# Patient Record
Sex: FEMALE | Race: WHITE | NOT HISPANIC OR LATINO | URBAN - METROPOLITAN AREA
[De-identification: names, ages, dates, MRNs, and addresses within clinical notes are randomized per-mention and may not be internally consistent; named-entity substitution may affect disease eponyms.]

---

## 2021-01-29 VITALS — TEMPERATURE: 98.3 F

## 2021-01-29 DIAGNOSIS — Z23 HIGH PRIORITY FOR COVID-19 VIRUS VACCINATION: Primary | ICD-10-CM

## 2021-01-29 PROCEDURE — 91301 PR COVID VAC MODERNA 100 MCG/0.5 ML IM: CPT | Performed by: FAMILY MEDICINE

## 2021-01-29 PROCEDURE — 0011A PR COVID VAC MODERNA 100 MCG/0.5 ML IM: CPT | Performed by: FAMILY MEDICINE

## 2021-02-26 DIAGNOSIS — Z23 HIGH PRIORITY FOR COVID-19 VIRUS VACCINATION: Primary | ICD-10-CM

## 2021-02-26 PROCEDURE — 91301 PR COVID VAC MODERNA 100 MCG/0.5 ML IM: CPT | Performed by: FAMILY MEDICINE

## 2021-02-26 PROCEDURE — 0012A PR COVID VAC MODERNA 100 MCG/0.5 ML IM: CPT | Performed by: FAMILY MEDICINE

## 2025-03-24 ENCOUNTER — OFFICE VISIT (OUTPATIENT)
Dept: FAMILY MEDICINE | Facility: CLINIC | Age: 75
End: 2025-03-24

## 2025-03-24 VITALS
BODY MASS INDEX: 20.73 KG/M2 | SYSTOLIC BLOOD PRESSURE: 115 MMHG | DIASTOLIC BLOOD PRESSURE: 64 MMHG | WEIGHT: 117 LBS | OXYGEN SATURATION: 98 % | HEIGHT: 63 IN | HEART RATE: 63 BPM

## 2025-03-24 DIAGNOSIS — N18.31 STAGE 3A CHRONIC KIDNEY DISEASE (H): ICD-10-CM

## 2025-03-24 DIAGNOSIS — R53.83 OTHER FATIGUE: Primary | ICD-10-CM

## 2025-03-24 DIAGNOSIS — Z12.31 ENCOUNTER FOR SCREENING MAMMOGRAM FOR BREAST CANCER: ICD-10-CM

## 2025-03-24 DIAGNOSIS — F33.0 MAJOR DEPRESSIVE DISORDER, RECURRENT EPISODE, MILD: ICD-10-CM

## 2025-03-24 DIAGNOSIS — F10.21 CHRONIC ALCOHOLISM IN REMISSION (H): ICD-10-CM

## 2025-03-24 PROCEDURE — 3078F DIAST BP <80 MM HG: CPT | Performed by: STUDENT IN AN ORGANIZED HEALTH CARE EDUCATION/TRAINING PROGRAM

## 2025-03-24 PROCEDURE — 3074F SYST BP LT 130 MM HG: CPT | Performed by: STUDENT IN AN ORGANIZED HEALTH CARE EDUCATION/TRAINING PROGRAM

## 2025-03-24 PROCEDURE — 99203 OFFICE O/P NEW LOW 30 MIN: CPT | Performed by: STUDENT IN AN ORGANIZED HEALTH CARE EDUCATION/TRAINING PROGRAM

## 2025-03-24 RX ORDER — MULTIPLE VITAMINS W/ MINERALS TAB 9MG-400MCG
1 TAB ORAL DAILY
COMMUNITY

## 2025-03-24 RX ORDER — LISINOPRIL 10 MG/1
10 TABLET ORAL DAILY
COMMUNITY
Start: 2023-09-02

## 2025-03-24 RX ORDER — TRAZODONE HYDROCHLORIDE 50 MG/1
50 TABLET ORAL AT BEDTIME
COMMUNITY
Start: 2023-11-20

## 2025-03-24 RX ORDER — ATORVASTATIN CALCIUM 20 MG/1
20 TABLET, FILM COATED ORAL DAILY
COMMUNITY
Start: 2023-09-28

## 2025-03-24 ASSESSMENT — PATIENT HEALTH QUESTIONNAIRE - PHQ9
SUM OF ALL RESPONSES TO PHQ QUESTIONS 1-9: 8
10. IF YOU CHECKED OFF ANY PROBLEMS, HOW DIFFICULT HAVE THESE PROBLEMS MADE IT FOR YOU TO DO YOUR WORK, TAKE CARE OF THINGS AT HOME, OR GET ALONG WITH OTHER PEOPLE: SOMEWHAT DIFFICULT
SUM OF ALL RESPONSES TO PHQ QUESTIONS 1-9: 8
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
## 2025-03-26 PROBLEM — F10.21 CHRONIC ALCOHOLISM IN REMISSION (H): Status: ACTIVE | Noted: 2025-03-26

## 2025-03-26 PROBLEM — F33.1 MAJOR DEPRESSIVE DISORDER, RECURRENT EPISODE, MODERATE (H): Status: RESOLVED | Noted: 2025-03-26 | Resolved: 2025-03-26

## 2025-03-26 PROBLEM — N18.31 STAGE 3A CHRONIC KIDNEY DISEASE (H): Status: ACTIVE | Noted: 2025-03-26

## 2025-03-26 PROBLEM — F33.1 MAJOR DEPRESSIVE DISORDER, RECURRENT EPISODE, MODERATE (H): Status: ACTIVE | Noted: 2025-03-26

## 2025-03-26 NOTE — ASSESSMENT & PLAN NOTE
-very borderline  -GFR was 59 on 4/14/2024  -GFR was 54 on 12/11/2023  -GFR was >60 on 5/31/2023  -GFR was 57 on 12/29/2022  -GFR was >60 on 3/15/2022  -GFR was 45 on 3/11/2021  -GFR was 59 on 3/12/2020  -GFR was >60 on 1/22/2019  -Discussed her history and how patient fluctuates with very mild numbers

## 2025-03-26 NOTE — ASSESSMENT & PLAN NOTE
-patient states that she is hardly ever tempted at this point  -goes to AA meetings to support others

## 2025-03-26 NOTE — ASSESSMENT & PLAN NOTE
-PHQ9 score 8  -current regimen: fluoxetine  -discussed recent fatigue, patient does not believe to be related to her depression

## 2025-04-28 ENCOUNTER — OFFICE VISIT (OUTPATIENT)
Dept: FAMILY MEDICINE | Facility: CLINIC | Age: 75
End: 2025-04-28

## 2025-04-28 VITALS
OXYGEN SATURATION: 99 % | WEIGHT: 116 LBS | HEIGHT: 63 IN | SYSTOLIC BLOOD PRESSURE: 115 MMHG | HEART RATE: 65 BPM | DIASTOLIC BLOOD PRESSURE: 61 MMHG | BODY MASS INDEX: 20.55 KG/M2

## 2025-04-28 DIAGNOSIS — R73.03 PREDIABETES: ICD-10-CM

## 2025-04-28 DIAGNOSIS — N18.31 STAGE 3A CHRONIC KIDNEY DISEASE (H): ICD-10-CM

## 2025-04-28 DIAGNOSIS — I10 ESSENTIAL HYPERTENSION: ICD-10-CM

## 2025-04-28 DIAGNOSIS — R53.83 OTHER FATIGUE: ICD-10-CM

## 2025-04-28 DIAGNOSIS — F33.0 MILD EPISODE OF RECURRENT MAJOR DEPRESSIVE DISORDER: ICD-10-CM

## 2025-04-28 DIAGNOSIS — M85.852 OSTEOPENIA OF NECK OF LEFT FEMUR: ICD-10-CM

## 2025-04-28 DIAGNOSIS — Z00.00 MEDICARE ANNUAL WELLNESS VISIT, SUBSEQUENT: Primary | ICD-10-CM

## 2025-04-28 DIAGNOSIS — F51.01 PRIMARY INSOMNIA: ICD-10-CM

## 2025-04-28 DIAGNOSIS — E78.2 MIXED HYPERLIPIDEMIA: ICD-10-CM

## 2025-04-28 LAB
% GRANULOCYTES: 61.6 % (ref 42.2–75.2)
ALBUMIN SERPL BCG-MCNC: 4.3 G/DL (ref 3.5–5.2)
ALP SERPL-CCNC: 69 U/L (ref 40–150)
ALT SERPL W P-5'-P-CCNC: 17 U/L (ref 0–50)
ANION GAP SERPL CALCULATED.3IONS-SCNC: 9 MMOL/L (ref 7–15)
AST SERPL W P-5'-P-CCNC: 19 U/L (ref 0–45)
BILIRUB SERPL-MCNC: 0.4 MG/DL
BUN SERPL-MCNC: 18.7 MG/DL (ref 8–23)
CALCIUM SERPL-MCNC: 9.5 MG/DL (ref 8.8–10.4)
CHLORIDE SERPL-SCNC: 101 MMOL/L (ref 98–107)
CHOLESTEROL: 175 MG/DL (ref 100–199)
CREAT SERPL-MCNC: 1.04 MG/DL (ref 0.51–0.95)
CREAT UR-MCNC: 121 MG/DL
EGFRCR SERPLBLD CKD-EPI 2021: 56 ML/MIN/1.73M2
EST. AVERAGE GLUCOSE BLD GHB EST-MCNC: 120 MG/DL
FASTING STATUS PATIENT QL REPORTED: YES
FASTING?: YES
GLUCOSE SERPL-MCNC: 97 MG/DL (ref 70–99)
HBA1C MFR BLD: 5.8 %
HCO3 SERPL-SCNC: 27 MMOL/L (ref 22–29)
HCT VFR BLD AUTO: 38.7 % (ref 35–46)
HDL (RMG): 46 MG/DL (ref 40–?)
HEMOGLOBIN: 13.1 G/DL (ref 11.8–15.5)
LDL CALCULATED (RMG): 111 MG/DL (ref 0–130)
LYMPHOCYTES NFR BLD AUTO: 30.7 % (ref 20.5–51.1)
MCH RBC QN AUTO: 27.9 PG (ref 27–31)
MCHC RBC AUTO-ENTMCNC: 33.9 G/DL (ref 33–37)
MCV RBC AUTO: 82.1 FL (ref 80–100)
MICROALBUMIN UR-MCNC: <12 MG/L
MICROALBUMIN/CREAT UR: NORMAL MG/G{CREAT}
MONOCYTES NFR BLD AUTO: 7.7 % (ref 1.7–9.3)
PLATELET # BLD AUTO: 199 K/UL (ref 140–450)
POTASSIUM SERPL-SCNC: 4.3 MMOL/L (ref 3.4–5.3)
PROT SERPL-MCNC: 6.7 G/DL (ref 6.4–8.3)
RBC # BLD AUTO: 4.71 X10/CMM (ref 3.7–5.2)
SODIUM SERPL-SCNC: 137 MMOL/L (ref 135–145)
TRIGLYCERIDES (RMG): 88 MG/DL (ref 0–149)
TSH SERPL DL<=0.005 MIU/L-ACNC: 1.18 UIU/ML (ref 0.3–4.2)
VIT B12 SERPL-MCNC: 679 PG/ML (ref 232–1245)
VIT D+METAB SERPL-MCNC: 72 NG/ML (ref 20–50)
WBC # BLD AUTO: 4.3 X10/CMM (ref 3.8–11)

## 2025-04-28 PROCEDURE — 84155 ASSAY OF PROTEIN SERUM: CPT | Performed by: STUDENT IN AN ORGANIZED HEALTH CARE EDUCATION/TRAINING PROGRAM

## 2025-04-28 PROCEDURE — 80053 COMPREHEN METABOLIC PANEL: CPT | Performed by: STUDENT IN AN ORGANIZED HEALTH CARE EDUCATION/TRAINING PROGRAM

## 2025-04-28 PROCEDURE — 82306 VITAMIN D 25 HYDROXY: CPT | Performed by: STUDENT IN AN ORGANIZED HEALTH CARE EDUCATION/TRAINING PROGRAM

## 2025-04-28 PROCEDURE — G0439 PPPS, SUBSEQ VISIT: HCPCS | Performed by: STUDENT IN AN ORGANIZED HEALTH CARE EDUCATION/TRAINING PROGRAM

## 2025-04-28 PROCEDURE — 83036 HEMOGLOBIN GLYCOSYLATED A1C: CPT | Performed by: STUDENT IN AN ORGANIZED HEALTH CARE EDUCATION/TRAINING PROGRAM

## 2025-04-28 PROCEDURE — 85025 COMPLETE CBC W/AUTO DIFF WBC: CPT | Performed by: STUDENT IN AN ORGANIZED HEALTH CARE EDUCATION/TRAINING PROGRAM

## 2025-04-28 PROCEDURE — 80061 LIPID PANEL: CPT | Mod: QW | Performed by: STUDENT IN AN ORGANIZED HEALTH CARE EDUCATION/TRAINING PROGRAM

## 2025-04-28 PROCEDURE — 36415 COLL VENOUS BLD VENIPUNCTURE: CPT | Performed by: STUDENT IN AN ORGANIZED HEALTH CARE EDUCATION/TRAINING PROGRAM

## 2025-04-28 PROCEDURE — 82607 VITAMIN B-12: CPT | Performed by: STUDENT IN AN ORGANIZED HEALTH CARE EDUCATION/TRAINING PROGRAM

## 2025-04-28 PROCEDURE — 84443 ASSAY THYROID STIM HORMONE: CPT | Mod: 90 | Performed by: STUDENT IN AN ORGANIZED HEALTH CARE EDUCATION/TRAINING PROGRAM

## 2025-04-28 PROCEDURE — 84443 ASSAY THYROID STIM HORMONE: CPT | Performed by: STUDENT IN AN ORGANIZED HEALTH CARE EDUCATION/TRAINING PROGRAM

## 2025-04-28 PROCEDURE — 82570 ASSAY OF URINE CREATININE: CPT | Mod: 90 | Performed by: STUDENT IN AN ORGANIZED HEALTH CARE EDUCATION/TRAINING PROGRAM

## 2025-04-28 PROCEDURE — 3074F SYST BP LT 130 MM HG: CPT | Performed by: STUDENT IN AN ORGANIZED HEALTH CARE EDUCATION/TRAINING PROGRAM

## 2025-04-28 PROCEDURE — 99213 OFFICE O/P EST LOW 20 MIN: CPT | Mod: 25 | Performed by: STUDENT IN AN ORGANIZED HEALTH CARE EDUCATION/TRAINING PROGRAM

## 2025-04-28 PROCEDURE — 82043 UR ALBUMIN QUANTITATIVE: CPT | Performed by: STUDENT IN AN ORGANIZED HEALTH CARE EDUCATION/TRAINING PROGRAM

## 2025-04-28 PROCEDURE — 3078F DIAST BP <80 MM HG: CPT | Performed by: STUDENT IN AN ORGANIZED HEALTH CARE EDUCATION/TRAINING PROGRAM

## 2025-04-28 RX ORDER — TRAZODONE HYDROCHLORIDE 50 MG/1
50 TABLET ORAL AT BEDTIME
Qty: 90 TABLET | Refills: 2 | Status: SHIPPED | OUTPATIENT
Start: 2025-04-28 | End: 2026-01-23

## 2025-04-28 RX ORDER — AMLODIPINE BESYLATE 5 MG/1
5 TABLET ORAL DAILY
COMMUNITY
Start: 2023-09-02

## 2025-04-28 RX ORDER — LISINOPRIL 10 MG/1
10 TABLET ORAL DAILY
Qty: 90 TABLET | Refills: 2 | Status: SHIPPED | OUTPATIENT
Start: 2025-04-28

## 2025-04-28 RX ORDER — IBUPROFEN 200 MG
1 CAPSULE ORAL 2 TIMES DAILY
COMMUNITY

## 2025-04-28 SDOH — HEALTH STABILITY: PHYSICAL HEALTH: ON AVERAGE, HOW MANY DAYS PER WEEK DO YOU ENGAGE IN MODERATE TO STRENUOUS EXERCISE (LIKE A BRISK WALK)?: 3 DAYS

## 2025-04-28 ASSESSMENT — ENCOUNTER SYMPTOMS
CONSTIPATION: 0
HEADACHES: 0
BLOOD IN STOOL: 0
SORE THROAT: 0
SHORTNESS OF BREATH: 0
DIZZINESS: 0
HEMATURIA: 0
DIARRHEA: 0
BLURRED VISION: 0
NAUSEA: 0
FEVER: 0
COUGH: 0
PALPITATIONS: 0
CHILLS: 0
ABDOMINAL PAIN: 0
VOMITING: 0

## 2025-04-28 ASSESSMENT — PATIENT HEALTH QUESTIONNAIRE - PHQ9
SUM OF ALL RESPONSES TO PHQ QUESTIONS 1-9: 9
SUM OF ALL RESPONSES TO PHQ QUESTIONS 1-9: 9

## 2025-04-28 ASSESSMENT — SOCIAL DETERMINANTS OF HEALTH (SDOH): HOW OFTEN DO YOU GET TOGETHER WITH FRIENDS OR RELATIVES?: ONCE A WEEK

## 2025-04-28 NOTE — PROGRESS NOTES
Preventive Care Visit  [facility]  Christina Kennedy DO, Family Medicine  Apr 28, 2025      Assessment & Plan   Assessment & Plan  Medicare annual wellness visit, subsequent  -Will order appropriate blood work for patient's age, medical conditions and BMI  -Reviewed recommended screening and health guidelines during this visit   Orders:    VENOUS COLLECTION    Other fatigue  -will check for common causes of fatigue such as anemia, thyroid disorder, B12 deficiency, Vitamin D deficiency  Orders:    CBC with Diff/Plt ([facility])    TSH with free T4 reflex; Future    Vitamin B12; Future    Vitamin D Deficiency; Future    Stage 3a chronic kidney disease (H)  -very borderline  -GFR was 59 on 4/14/2024  -GFR was 54 on 12/11/2023  -GFR was >60 on 5/31/2023  -GFR was 57 on 12/29/2022  -GFR was >60 on 3/15/2022  -GFR was 45 on 3/11/2021  -GFR was 59 on 3/12/2020  -GFR was >60 on 1/22/2019  -Discussed her history and how patient fluctuates  -discussed that if this current GFR is still Stage 3a, will prescribe SGLT2 inhibitor and see what that cost looks like    Orders:    Comprehensive metabolic panel; Future    Essential hypertension  -BP in office today: 115/61 mmHg  -Regimen: lisinopril 10mg, amlodipine 5 mg  ~Controlled per JNC8 criteria  -Will continue to monitor  Orders:    Albumin Random Urine Quantitative with Creat Ratio; Future    lisinopril (ZESTRIL) 10 MG tablet; Take 1 tablet (10 mg) by mouth daily.    Mixed hyperlipidemia  -current regimen: atorvastatin 20 mg  -will continue to monitor  Orders:    Lipid Profile ([facility])    Prediabetes  -Last A1c 6%  -will recheck  Orders:    Hemoglobin A1c; Future    Primary insomnia  -current regimen: trazodone  Orders:    traZODone (DESYREL) 50 MG tablet; Take 1 tablet (50 mg) by mouth at bedtime.    Mild episode of recurrent major depressive disorder  -current regimen: fluoxetine 20 mg  -PHQ9 score: 9   -positive depression screening  -would like to start back with a  therapist, information given to patient today       Osteopenia of neck of left femur  -current regimen: calcium and vitamin D supplementation  -due for repeat DEXA in 05/2025       BMI 20.0-20.9, adult  -Diet: it's pretty good, working on being healthier, preDM  -Exercise: walk the dog sometimes, 30-40 sit ups a day             Counseling  Appropriate preventive services were addressed with this patient via screening, questionnaire, or discussion as appropriate for fall prevention, nutrition, physical activity, Tobacco-use cessation, social engagement, weight loss and cognition.  Checklist reviewing preventive services available has been given to the patient.  Reviewed patient's diet, addressing concerns and/or questions.   Discussed possible causes of fatigue.   The patient's PHQ-9 score is consistent with mild depression. She was provided with information regarding depression.     Follow-up   Return if symptoms worsen or fail to improve, or based on lab results.    Rebecca Eng is a 74 year old, presenting for the following:  Physical (Fasting, no concerns) and Health Maintenance (Colonoscopy: UTD - 10/2022/DEXA: Due, last in 2023 (2 year follow up recommended)/Vaccines: Covid due (None in stock))    HPI  Social History  Household members include boyfriend, dog   Smoking History: never smoker  Alcohol Use History: sober, hx of alcohol abuse  Illicit Drug Use History: no  Occupation: retired    General Health  Dental Visit/Problems: was going religiously, but skipped last year, will have appointment next fall  Vision Problems: patient is concerned that left eye is not as good as it use to be, will set up appointment  Hearing Loss: no concerns  Vaccination Status:  COVID: 10/21/2024, due, out of stock in office today  Influenza: 10/21/2024  Pneumococcal: 1/22/2019  Shingles: 8/1/2019, 1/10/2023  Tdap:9/5/2023   RSV: 3/13/2024  Diet: it's pretty good, working on being healthier, preDM  Exercise: walk the dog  sometimes, 30-40 sit ups a day  Reproductive Health:  Sexually active: Y  M/W/B: M    Screening  HIV: aged out  Cervical Cancer Screening: aged out  Mammogram: 2023  DEXA: 2023, will be due in May  Colorectal Cancer Screening: 10/27/202    Patient has a history of allergies to TMP-SMX and vancomycin.    Patient has a PSHx of wrist repair L & R, right ankle repair.    Patient has a Fhx of mother with HTN and HLD. Father with HTN, HLD, Alzheimer Ds, Stroke and kidney cancer s/p nephrectomy. Maternal grandmother with alcholism and stroke. Paternal grandmother who  from PNA.    Patient has a PMHx of HLD.    Patient has a history of prediabetes, last A1c was 6.0%.    Patient has a history of HTN on lisinopril and amlodipine.    Patient has a history of migraines. Not currently on medication. Has not had a migraine for some time.    Patient has a history of depression, anxiety, PTSD, ADHD. Patient currently utilizes fluoxetine. Does not currently follow with a psychology, would like to start back up.    Patient has a history of osteopenia on calcium and vitamin D supplementation.    Patient has a history of HSV on PRN valacyclovir. Has not required medication in a long time.    Patient has a history of alcoholism in remission.    Patient has a history of Stage 3a chronic kidney disease.    Patient states that her fatigue is still bothersome, though she feels like she might be doing better. Patient states that she makes herself get up at 9 and makes herself go to bed early. Watches a hallmark movie from 7-9.    Patient states that she fends off constipation by utilizing MagCitrate and Miralax at time to help keep her regular. Discussed how Miralax works.          2025   General Health   How would you rate your overall physical health? Good   Feel stress (tense, anxious, or unable to sleep) Only a little   (!) STRESS CONCERN      2025   Nutrition   Diet: Low salt    Low fat/cholesterol     Carbohydrate counting       Multiple values from one day are sorted in reverse-chronological order         4/28/2025   Exercise   Days per week of moderate/strenous exercise 3 days         4/28/2025   Social Factors   Frequency of gathering with friends or relatives Once a week   Worry food won't last until get money to buy more No   Food not last or not have enough money for food? No   Do you have housing? (Housing is defined as stable permanent housing and does not include staying outside in a car, in a tent, in an abandoned building, in an overnight shelter, or couch-surfing.) Yes   Are you worried about losing your housing? No   Lack of transportation? No   Unable to get utilities (heat,electricity)? No         4/28/2025   Fall Risk   Fallen 2 or more times in the past year? No   Trouble with walking or balance? No          4/28/2025   Activities of Daily Living- Home Safety   Needs help with the following daily activites None of the above   Safety concerns in the home None of the above         4/28/2025   Dental   Dentist two times every year? Yes         4/28/2025   Hearing Screening   Hearing concerns? (!) IT'S HARD TO FOLLOW A CONVERSATION IN A NOISY RESTAURANT OR CROWDED ROOM.   Left ear:  500Hz  Pass  1000Hz  Pass  2000Hz  Pass  4000Hz  Pass    Right ear:  500Hz  Pass  1000Hz  Pass  2000Hz  Pass  4000Hz  Pass      4/28/2025   Driving Risk Screening   Patient/family members have concerns about driving No         4/28/2025   General Alertness/Fatigue Screening   Have you been more tired than usual lately? (!) YES         4/28/2025   Urinary Incontinence Screening   Bothered by leaking urine in past 6 months No       Today's PHQ-9 Score:       4/28/2025     9:37 AM   PHQ-9 SCORE   PHQ-9 Total Score MyChart 9 (Mild depression)   PHQ-9 Total Score 9        Patient-reported         4/28/2025   Substance Use   Alcohol more than 3/day or more than 7/wk No   Do you have a current opioid prescription? No   How  severe/bad is pain from 1 to 10? 2/10   Do you use any other substances recreationally? No     Social History     Tobacco Use    Smoking status: Never     Passive exposure: Never    Smokeless tobacco: Never   Substance Use Topics    Alcohol use: Not Currently     Comment: Sober    Drug use: Never          Mammogram Screening - After age 74- determine frequency with patient based on health status, life expectancy and patient goals    ASCVD Risk   The 10-year ASCVD risk score (Hugh PATRICIO, et al., 2019) is: 17.1%    Values used to calculate the score:      Age: 74 years      Sex: Female      Is Non- : No      Diabetic: No      Tobacco smoker: No      Systolic Blood Pressure: 115 mmHg      Is BP treated: Yes      HDL Cholesterol: 46 mg/dL      Total Cholesterol: 175 mg/dL    Fracture Risk Assessment Tool  Link to Frax Calculator  Use the information below to complete the Frax calculator  : 1950  Sex: female  Weight (kg): 52.6 kg (actual weight)  Height (cm): 160.3 cm  Previous Fragility Fracture:  No  History of parent with fractured hip:  No  Current Smoking:  No  Patient has been on glucocorticoids for more than 3 months (5mg/day or more): No  Rheumatoid Arthritis on Problem List:  No  Secondary Osteoporosis on Problem List:  No  Consumes 3 or more units of alcohol per day: No  Femoral Neck BMD (g/cm2)            Reviewed and updated as needed this visit by Provider   Tobacco     Med Hx  Surg Hx  Fam Hx  Soc Hx Sexual Activity          Past Medical History:   Diagnosis Date    ADHD (attention deficit hyperactivity disorder)     Alcoholism in remission (H)     Anxiety     Cold sore     Depression     Essential hypertension     Migraine     Mixed hyperlipidemia     Osteopenia     Prediabetes     PTSD (post-traumatic stress disorder)     Stage 3a chronic kidney disease (CKD) (H)      Past Surgical History:   Procedure Laterality Date    ANKLE FRACTURE SURGERY Right     WRIST  "FRACTURE SURGERY      R, L     Current providers sharing in care for this patient include:  Patient Care Team:  Christina Kennedy DO as PCP - General (Family Medicine)  Group, [facility] as Assigned PCP    The following health maintenance items are reviewed in Epic and correct as of today:  Health Maintenance   Topic Date Due    BMP  Never done    MICROALBUMIN  Never done    ADVANCE CARE PLANNING  Never done    DIABETES SCREENING  Never done    URINALYSIS  Never done    MEDICARE ANNUAL WELLNESS VISIT  03/15/2023    COVID-19 Vaccine (9 - 2024-25 season) 04/21/2025    PHQ-9  10/28/2025    LIPID  04/28/2026    FALL RISK ASSESSMENT  04/28/2026    HEMOGLOBIN  04/28/2026    COLORECTAL CANCER SCREENING  10/27/2032    DTAP/TDAP/TD IMMUNIZATION (3 - Td or Tdap) 09/05/2033    DEXA  09/20/2038    HEPATITIS C SCREENING  Completed    DEPRESSION ACTION PLAN  Completed    INFLUENZA VACCINE  Completed    Pneumococcal Vaccine: 50+ Years  Completed    ZOSTER IMMUNIZATION  Completed    RSV VACCINE  Completed    HPV IMMUNIZATION  Aged Out    MENINGITIS IMMUNIZATION  Aged Out    MAMMO SCREENING  Discontinued       Review of Systems   Constitutional:  Positive for malaise/fatigue. Negative for chills and fever.   HENT:  Negative for hearing loss and sore throat.    Eyes:  Negative for blurred vision.   Respiratory:  Negative for cough and shortness of breath.    Cardiovascular:  Negative for chest pain and palpitations.   Gastrointestinal:  Negative for abdominal pain, blood in stool, constipation, diarrhea, melena, nausea and vomiting.   Genitourinary:  Negative for hematuria.   Neurological:  Negative for dizziness and headaches.         Objective    Exam  /61   Pulse 65   Ht 1.604 m (5' 3.13\")   Wt 52.6 kg (116 lb)   SpO2 99%   BMI 20.47 kg/m     Estimated body mass index is 20.47 kg/m  as calculated from the following:    Height as of this encounter: 1.604 m (5' 3.13\").    Weight as of this encounter: 52.6 kg (116 " lb).    Physical Exam  Constitutional:       General: She is not in acute distress.     Appearance: She is not ill-appearing.   HENT:      Head: Normocephalic and atraumatic.      Right Ear: Tympanic membrane, ear canal and external ear normal. There is no impacted cerumen.      Left Ear: Tympanic membrane, ear canal and external ear normal. There is no impacted cerumen.      Nose: Nose normal.      Mouth/Throat:      Mouth: Mucous membranes are moist.      Pharynx: No oropharyngeal exudate or posterior oropharyngeal erythema.   Eyes:      Extraocular Movements: Extraocular movements intact.      Conjunctiva/sclera: Conjunctivae normal.      Pupils: Pupils are equal, round, and reactive to light.   Cardiovascular:      Rate and Rhythm: Normal rate and regular rhythm.      Pulses: Normal pulses.      Heart sounds: Normal heart sounds.   Pulmonary:      Effort: Pulmonary effort is normal. No respiratory distress.      Breath sounds: Normal breath sounds. No wheezing.   Abdominal:      Palpations: Abdomen is soft. There is no mass.   Lymphadenopathy:      Cervical: No cervical adenopathy.   Skin:     General: Skin is warm and dry.   Neurological:      General: No focal deficit present.      Mental Status: She is alert.   Psychiatric:         Thought Content: Thought content normal.              4/28/2025   Mini Cog   Clock Draw Score 2 Normal   3 Item Recall 3 objects recalled   Mini Cog Total Score 5         Signed Electronically by: Christina Kennedy DO

## 2025-04-28 NOTE — ASSESSMENT & PLAN NOTE
-BP in office today: 115/61 mmHg  -Regimen: lisinopril 10mg, amlodipine 5 mg  ~Controlled per JNC8 criteria  -Will continue to monitor  Orders:    Albumin Random Urine Quantitative with Creat Ratio; Future    lisinopril (ZESTRIL) 10 MG tablet; Take 1 tablet (10 mg) by mouth daily.

## 2025-04-28 NOTE — ASSESSMENT & PLAN NOTE
-very borderline  -GFR was 59 on 4/14/2024  -GFR was 54 on 12/11/2023  -GFR was >60 on 5/31/2023  -GFR was 57 on 12/29/2022  -GFR was >60 on 3/15/2022  -GFR was 45 on 3/11/2021  -GFR was 59 on 3/12/2020  -GFR was >60 on 1/22/2019  -Discussed her history and how patient fluctuates  -discussed that if this current GFR is still Stage 3a, will prescribe SGLT2 inhibitor and see what that cost looks like    Orders:    Comprehensive metabolic panel; Future

## 2025-04-28 NOTE — ASSESSMENT & PLAN NOTE
-current regimen: fluoxetine 20 mg  -PHQ9 score: 9   -positive depression screening  -would like to start back with a therapist, information given to patient today

## 2025-04-28 NOTE — PATIENT INSTRUCTIONS
Mental Health Clinics  -please check clinics with your insurance for coverage    [employer]  -can search on this website for providers that fit your criteria (insurance, issue, location, etc.)

## 2025-04-28 NOTE — LETTER
April 29, 2025          Albertina Mar  [address] [place] [address]        Dear Ms. Mar,     Your thyroid labs are within normal limits. You do not have hypothyroidism.     Your CBC ( white blood cell count, hemoglobin, platelets) was normal. There was no anemia.     Your cholesterol labs show your bad cholesterol LDL, was normal. Please continue with your atorvastatin.     Your CMP (electrolytes,kidney function, liver function and fasting blood sugar) looks good.  It did however show that mildly lowered kidney function, very similar to previous. So I will be sending over the medication we discussed, call an SGLT2 inhibitor. This can be an expensive medication, so be warned before going to pick it up at the pharmacy. I know you also would like to know your best options to continue to protect your kidneys, so I have included some recommendations from kidney experts below.     Your Hemoglobin A1c (3 month blood sugar average) was slightly elevated at 5.8% which is improved from your last level of 6.0%.     Your Vitamin D levels are a little elevated. If you are supplementing, we may need to decrease your units a little.     Your urine screening was good.     Your Vitamin B 12 level was within normal limits.     When I was finishing your note after you left, I realized that when you had your last DEXA scan, a 2 year follow up was recommended. That would be this coming May. So I have ordered a scan for you to schedule or you may receive a call about scheduling.     Please let me know if you have any questions or concerns,   Dr. Kennedy     Chronic Kidney Disease Recommendations   -Evidence-based lifestyle interventions for the prevention and treatment of CKD include:   -a diet low in sodium (less than 2,000 to 2,300 mg per day)   -a structured moderate-intensity exercise program of at least 150 minutes per week   -smoking cessation   -Dietary protein should be limited to 0.6 to 0.8 g per kg per day in  patients with CKD stage 3 or 4 to reduce disease progression   -Resistance exercise and adequate caloric and micronutrient intake are recommended to decrease the risk of sarcopenia   -Plant-based diets have demonstrated benefits in the prevention and management of CKD because of reduced animal protein intake, increased consumption of anti-inflammatory phytonutrients, and their positive effect on controlling body weight, blood pressure, and blood glucose   -Attempting to get systolic blood pressure (the top value) as close to or less than 120 without symptoms   -Patients engaging in strenuous endurance exercise should ensure adequate hydration to avoid dehydration-related kidney injury and rhabdomyolysis.   -The use of nephrotoxic drugs should be limited or avoided where possible. Common examples include Advil, Aleve, Ibuprofen and other NSAIDs   -In addition to routine age-based vaccinations, a pneumococcal vaccination should be administered to patients between 19 and 49 years of age with ESRD and those on dialysis. This vaccine is recommended in all patients 50 + years.     [facility]   -please check clinics with your insurance for coverage     [facility] [facility]   -can search on this website for providers that fit your criteria (insurance, issue, location, etc.)     [facility]   Many Locations   [facility]   Appt Number: [phone]     [facility]   Many Locations   [facility]   Phone: [phone]     [facility]   Many locations   [facility]   Phone: [phone]     [facility]   Many locations   [facility]   Phone: [phone]     [facility]   [address], [place]   [facility]   Phone: [phone]     [facility]   [address]   [place], [place]   Phone: [phone]     [facility]   [address] [address]   [address]   [place], [place]   Phone: [phone]     Resulted Orders   CBC with  Diff/Plt ([facility])   Result Value Ref Range    WBC x10/cmm 4.3 3.8 - 11.0 x10/cmm    % Lymphocytes 30.7 20.5 - 51.1 %    % Monocytes 7.7 1.7 - 9.3 %    % Granulocytes 61.6 42.2 - 75.2 %    RBC x10/cmm 4.71 3.7 - 5.2 x10/cmm    Hemoglobin 13.1 11.8 - 15.5 g/dl    Hematocrit 38.7 35 - 46 %    MCV 82.1 80 - 100 fL    MCH 27.9 27.0 - 31.0 pg    MCHC 33.9 33.0 - 37.0 g/dL    Platelet Count 199 140 - 450 K/uL   Lipid Profile ([facility])   Result Value Ref Range    Cholesterol 175 100 - 199 mg/dL    HDL 46 >=40 mg/dL    Triglycerides 88 0 - 149 mg/dL    LDL CALCULATED ([facility]) 111 0 - 130 mg/dL    Patient Fasting? Yes    Comprehensive metabolic panel   Result Value Ref Range    Sodium 137 135 - 145 mmol/L    Potassium 4.3 3.4 - 5.3 mmol/L    Carbon Dioxide (CO2) 27 22 - 29 mmol/L    Anion Gap 9 7 - 15 mmol/L    Urea Nitrogen 18.7 8.0 - 23.0 mg/dL    Creatinine 1.04 (H) 0.51 - 0.95 mg/dL    GFR Estimate 56 (L) >60 mL/min/1.73m2      Comment:      eGFR calculated using 2021 CKD-EPI equation.    Calcium 9.5 8.8 - 10.4 mg/dL    Chloride 101 98 - 107 mmol/L    Glucose 97 70 - 99 mg/dL    Alkaline Phosphatase 69 40 - 150 U/L    AST 19 0 - 45 U/L    ALT 17 0 - 50 U/L    Protein Total 6.7 6.4 - 8.3 g/dL    Albumin 4.3 3.5 - 5.2 g/dL    Bilirubin Total 0.4 <=1.2 mg/dL    Patient Fasting > 8hrs? Yes    Hemoglobin A1c   Result Value Ref Range    Estimated Average Glucose 120 (H) <117 mg/dL    Hemoglobin A1C 5.8 (H) <5.7 %      Comment:      Normal <5.7%   Prediabetes 5.7-6.4%    Diabetes 6.5% or higher     Note: Adopted from ADA consensus guidelines.   TSH with free T4 reflex   Result Value Ref Range    TSH 1.18 0.30 - 4.20 uIU/mL   Vitamin B12   Result Value Ref Range    Vitamin B12 679 232 - 1,245 pg/mL   Vitamin D Deficiency   Result Value Ref Range    Vitamin D, Total (25-Hydroxy) 72 (H) 20 - 50 ng/mL      Comment:      indicates supplementation, with increased risk of hypercalciuria    Narrative    Season, race, dietary intake, and treatment  affect the concentration of 25-hydroxy-Vitamin D. Values may decrease during winter months and increase during summer months.    Vitamin D determination is routinely performed by an immunoassay specific for 25 hydroxyvitamin D3.  If an individual is on vitamin D2(ergocalciferol) supplementation, please specify 25 OH vitamin D2 and D3 level determination by LCMSMS test VITD23.     Albumin Random Urine Quantitative with Creat Ratio   Result Value Ref Range    Creatinine Urine mg/dL 121.0 mg/dL      Comment:      The reference ranges have not been established in urine creatinine. The results should be integrated into the clinical context for interpretation.    Albumin Urine mg/L <12.0 mg/L      Comment:      The reference ranges have not been established in urine albumin. The results should be integrated into the clinical context for interpretation.    Albumin Urine mg/g Cr        Comment:      Unable to calculate, urine albumin and/or urine creatinine is outside detectable limits.  Microalbuminuria is defined as an albumin:creatinine ratio of 17 to 299 for males and 25 to 299 for females. A ratio of albumin:creatinine of 300 or higher is indicative of overt proteinuria.  Due to biologic variability, positive results should be confirmed by a second, first-morning random or 24-hour timed urine specimen. If there is discrepancy, a third specimen is recommended. When 2 out of 3 results are in the microalbuminuria range, this is evidence for incipient nephropathy and warrants increased efforts at glucose control, blood pressure control, and institution of therapy with an angiotensin-converting-enzyme (ACE) inhibitor (if the patient can tolerate it).         If you have any questions or concerns, please call the clinic at the number listed above.       Sincerely,      Christina Kennedy, DO    Electronically signed

## 2025-04-28 NOTE — ASSESSMENT & PLAN NOTE
-current regimen: trazodone  Orders:    traZODone (DESYREL) 50 MG tablet; Take 1 tablet (50 mg) by mouth at bedtime.

## 2025-04-29 DIAGNOSIS — M85.852 OSTEOPENIA OF NECK OF LEFT FEMUR: Primary | ICD-10-CM

## 2025-04-29 DIAGNOSIS — N18.31 STAGE 3A CHRONIC KIDNEY DISEASE (H): ICD-10-CM

## 2025-05-08 DIAGNOSIS — F51.01 PRIMARY INSOMNIA: ICD-10-CM

## 2025-05-08 RX ORDER — TRAZODONE HYDROCHLORIDE 50 MG/1
100 TABLET ORAL AT BEDTIME
COMMUNITY
Start: 2025-05-08

## 2025-05-08 RX ORDER — TRAZODONE HYDROCHLORIDE 50 MG/1
50 TABLET ORAL AT BEDTIME
Qty: 90 TABLET | Refills: 0 | Status: SHIPPED | OUTPATIENT
Start: 2025-05-08 | End: 2025-08-06

## 2025-07-01 DIAGNOSIS — F51.01 PRIMARY INSOMNIA: ICD-10-CM

## 2025-07-01 RX ORDER — TRAZODONE HYDROCHLORIDE 100 MG/1
100 TABLET ORAL AT BEDTIME
Qty: 90 TABLET | Refills: 2 | Status: SHIPPED | OUTPATIENT
Start: 2025-07-01

## 2025-07-01 NOTE — TELEPHONE ENCOUNTER
Patient called clinic requesting prescription refill trazodone. Patient reports that she is taking 100mg daily at bedtime and is requesting prescription for that dose. Prescription pended and routed to Dr Kennedy-PCP for review. Brooke Haile

## 2025-08-04 DIAGNOSIS — I10 ESSENTIAL HYPERTENSION: ICD-10-CM

## 2025-08-04 RX ORDER — AMLODIPINE BESYLATE 2.5 MG/1
2.5 TABLET ORAL DAILY
Qty: 90 TABLET | Refills: 2 | Status: SHIPPED | OUTPATIENT
Start: 2025-08-04

## 2025-08-18 ENCOUNTER — MEDICAL CORRESPONDENCE (OUTPATIENT)
Dept: FAMILY MEDICINE | Facility: CLINIC | Age: 75
End: 2025-08-18